# Patient Record
Sex: MALE | NOT HISPANIC OR LATINO | Employment: STUDENT | URBAN - METROPOLITAN AREA
[De-identification: names, ages, dates, MRNs, and addresses within clinical notes are randomized per-mention and may not be internally consistent; named-entity substitution may affect disease eponyms.]

---
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
## 2025-08-07 ENCOUNTER — OFFICE VISIT (OUTPATIENT)
Dept: INTERNAL MEDICINE | Age: 20
End: 2025-08-07

## 2025-08-07 VITALS
OXYGEN SATURATION: 99 % | BODY MASS INDEX: 24.57 KG/M2 | HEIGHT: 71 IN | SYSTOLIC BLOOD PRESSURE: 114 MMHG | TEMPERATURE: 97.3 F | DIASTOLIC BLOOD PRESSURE: 64 MMHG | WEIGHT: 175.5 LBS | HEART RATE: 82 BPM | RESPIRATION RATE: 15 BRPM

## 2025-08-07 DIAGNOSIS — Z02.89 OTHER GENERAL MEDICAL EXAMINATION FOR ADMINISTRATIVE PURPOSES: Primary | ICD-10-CM

## 2025-08-07 ASSESSMENT — PATIENT HEALTH QUESTIONNAIRE - PHQ9
1. LITTLE INTEREST OR PLEASURE IN DOING THINGS: NOT AT ALL
CLINICAL INTERPRETATION OF PHQ2 SCORE: NO FURTHER SCREENING NEEDED
SUM OF ALL RESPONSES TO PHQ9 QUESTIONS 1 AND 2: 0
SUM OF ALL RESPONSES TO PHQ9 QUESTIONS 1 AND 2: 0
2. FEELING DOWN, DEPRESSED OR HOPELESS: NOT AT ALL

## (undated) NOTE — LETTER
Date & Time: 4/12/2023, 11:35 AM  Patient: Ana M Hess  Encounter Provider(s):    NAREN Rosales       To Whom It May Concern:    Ana M Hess was seen and treated in our department on 4/12/2023. He {Return to school/sport/work:[id number]}.     If you have any questions or concerns, please do not hesitate to call.        _____________________________  Physician/[facility] Signature

## (undated) NOTE — LETTER
Date & Time: 4/12/2023, 11:34 AM  Patient: Pola Friday  Encounter Provider(s):    NAREN Anderson       To Whom It May Concern:    Pola Friday was seen and treated in our department on 4/12/2023. He should not participate in gym/sports until 04/20/2023 unless otherwise specified by orthopedics .     If you have any questions or concerns, please do not hesitate to call.        _____________________________  Physician/[facility] Signature